# Patient Record
Sex: MALE | Race: WHITE
[De-identification: names, ages, dates, MRNs, and addresses within clinical notes are randomized per-mention and may not be internally consistent; named-entity substitution may affect disease eponyms.]

---

## 2018-01-21 ENCOUNTER — HOSPITAL ENCOUNTER (EMERGENCY)
Dept: HOSPITAL 17 - NEPC | Age: 33
Discharge: HOME | End: 2018-01-21
Payer: COMMERCIAL

## 2018-01-21 VITALS
HEART RATE: 96 BPM | OXYGEN SATURATION: 100 % | SYSTOLIC BLOOD PRESSURE: 160 MMHG | TEMPERATURE: 98 F | DIASTOLIC BLOOD PRESSURE: 102 MMHG | RESPIRATION RATE: 16 BRPM

## 2018-01-21 VITALS — HEIGHT: 67 IN | WEIGHT: 220.46 LBS | BODY MASS INDEX: 34.6 KG/M2

## 2018-01-21 VITALS — OXYGEN SATURATION: 100 %

## 2018-01-21 DIAGNOSIS — R20.2: ICD-10-CM

## 2018-01-21 DIAGNOSIS — K21.9: ICD-10-CM

## 2018-01-21 DIAGNOSIS — Z79.899: ICD-10-CM

## 2018-01-21 DIAGNOSIS — T78.40XA: Primary | ICD-10-CM

## 2018-01-21 DIAGNOSIS — Z88.2: ICD-10-CM

## 2018-01-21 LAB
BASOPHILS # BLD AUTO: 0.1 TH/MM3 (ref 0–0.2)
BASOPHILS NFR BLD: 0.8 % (ref 0–2)
BUN SERPL-MCNC: 10 MG/DL (ref 7–18)
CALCIUM SERPL-MCNC: 9.4 MG/DL (ref 8.5–10.1)
CHLORIDE SERPL-SCNC: 104 MEQ/L (ref 98–107)
CREAT SERPL-MCNC: 1.07 MG/DL (ref 0.6–1.3)
EOSINOPHIL # BLD: 0.1 TH/MM3 (ref 0–0.4)
EOSINOPHIL NFR BLD: 0.9 % (ref 0–4)
ERYTHROCYTE [DISTWIDTH] IN BLOOD BY AUTOMATED COUNT: 12.6 % (ref 11.6–17.2)
GFR SERPLBLD BASED ON 1.73 SQ M-ARVRAT: 80 ML/MIN (ref 89–?)
GLUCOSE SERPL-MCNC: 88 MG/DL (ref 74–106)
HCO3 BLD-SCNC: 30.2 MEQ/L (ref 21–32)
HCT VFR BLD CALC: 46.1 % (ref 39–51)
HGB BLD-MCNC: 16.1 GM/DL (ref 13–17)
LYMPHOCYTES # BLD AUTO: 2.4 TH/MM3 (ref 1–4.8)
LYMPHOCYTES NFR BLD AUTO: 26 % (ref 9–44)
MAGNESIUM SERPL-MCNC: 2.5 MG/DL (ref 1.5–2.5)
MCH RBC QN AUTO: 28.9 PG (ref 27–34)
MCHC RBC AUTO-ENTMCNC: 34.8 % (ref 32–36)
MCV RBC AUTO: 83.1 FL (ref 80–100)
MONOCYTE #: 0.8 TH/MM3 (ref 0–0.9)
MONOCYTES NFR BLD: 8.4 % (ref 0–8)
NEUTROPHILS # BLD AUTO: 5.8 TH/MM3 (ref 1.8–7.7)
NEUTROPHILS NFR BLD AUTO: 63.9 % (ref 16–70)
PLATELET # BLD: 315 TH/MM3 (ref 150–450)
PMV BLD AUTO: 8 FL (ref 7–11)
RBC # BLD AUTO: 5.55 MIL/MM3 (ref 4.5–5.9)
SODIUM SERPL-SCNC: 138 MEQ/L (ref 136–145)
WBC # BLD AUTO: 9.1 TH/MM3 (ref 4–11)

## 2018-01-21 PROCEDURE — 80048 BASIC METABOLIC PNL TOTAL CA: CPT

## 2018-01-21 PROCEDURE — 85025 COMPLETE CBC W/AUTO DIFF WBC: CPT

## 2018-01-21 PROCEDURE — 96374 THER/PROPH/DIAG INJ IV PUSH: CPT

## 2018-01-21 PROCEDURE — 93005 ELECTROCARDIOGRAM TRACING: CPT

## 2018-01-21 PROCEDURE — 96375 TX/PRO/DX INJ NEW DRUG ADDON: CPT

## 2018-01-21 PROCEDURE — 83735 ASSAY OF MAGNESIUM: CPT

## 2018-01-21 PROCEDURE — 71045 X-RAY EXAM CHEST 1 VIEW: CPT

## 2018-01-21 PROCEDURE — 99284 EMERGENCY DEPT VISIT MOD MDM: CPT

## 2018-01-21 NOTE — RADRPT
EXAM DATE/TIME:  01/21/2018 20:15 

 

[facility] COMPARISON:     

No previous studies available for comparison.

 

                     

INDICATIONS :     

Cough

                     

 

MEDICAL HISTORY :     

None.          

 

SURGICAL HISTORY :     

None.   

 

ENCOUNTER:     

Initial                                        

 

ACUITY:     

1 day      

 

PAIN SCORE:     

0/10

 

LOCATION:      

chest 

 

FINDINGS:     

A single view of the chest demonstrates the lungs to be symmetrically aerated without evidence of mas
s, infiltrate or effusion.  The cardiomediastinal contours are unremarkable.  Osseous structures are 
intact.

 

CONCLUSION:     No acute disease.  

 

 

 

 Harris Cuellar MD on January 21, 2018 at 20:28           

Board Certified Radiologist.

 This report was verified electronically.

## 2018-01-21 NOTE — PD
HPI


Chief Complaint:  Allergic/Adverse Reaction


Time Seen by Provider:  20:08


Travel History


International Travel<30 days:  No


Contact w/Intl Traveler<30days:  No


Traveled to known affect area:  No





History of Present Illness


HPI


The patient is a 32 year old male who presents to the [facility] emergency 

department with a history of a sensation of his throat closing/feeling like 

something was stuck in his throat while eating prior to arrival.  He reports 

that this is happens several times in the past over the last few months.  The 

patient reports that it seems to happen when he is eating cheese, usually on 

pasta with sauce.  On this occasion, the patient reports that he also has a 

tingling sensation on his throat and tongue as well as a sour sensation and the 

back of the throat and on the tongue.  He was concerned that this may be 

related to an allergic reaction.  He denies ever having problems with food 

allergies in the past.  He denies ever having allergy testing.  He denies 

having any rashes associated with this.  He denies having any chest pain or 

shortness of breath.  He denies having any abdominal cramping or diarrhea 

associated with this.  The patient does report having a history of acid reflux.

  He is on AcipHex.  He reports that this is usually well controlled on his 

AcipHex.  He reports that he has had endoscopy 1 year ago and was told to have 

it repeated in one year related to an abnormality.  He is unable to explain 

exactly what the abnormality was.  He denies having any difficulty swallowing 

solids such as meat.  On review of systems otherwise, the patient denies having 

any recent fevers, cough, congestion, neck pain,  vomiting, urinary symptoms, 

or neurologic symptoms.





[facility]


Past Medical History


*** Narrative Medical


The patient's past medical history is significant for anxiety disorder, GERD. 

PCP Dr. Navarro in [place]


Medical History:  Denies Significant Hx





Past Surgical History


*** Narrative Surgical


The patient's past surgical history is significant for cholecystectomy.


Cholecystectomy:  Yes





Social History


Alcohol Use:  No


Tobacco Use:  No


Substance Use:  No





Allergies-Medications


(Allergen,Severity, Reaction):  


Coded Allergies:  


     Sulfa (Sulfonamide Antibiotics) (Verified  Allergy, Unknown, 1/21/18)


Reported Meds & Prescriptions





Reported Meds & Active Scripts


Active


Medrol Dosepak (Methylprednisolone) 4 Mg Dspk 4 Mg PO AS DIRECTED


     Per Pharmacist direction


Claritin (Loratadine) 10 Mg Cap 10 Mg PO DAILY 7 Days


Epipen 2-Jasvir Inj (Epinephrine) 0.3 Mg/0.3 Ml Pfpen 0.3 Mg IM ONCE PRN


Reported


Lorazepam 0.5 Mg Tab 0.5 Mg PO DAILY PRN





Narrative Medication


Aciphex





Review of Systems


Except as stated in HPI:  all other systems reviewed are Neg


General / Constitutional:  No: Fever


Eyes:  No: Visual changes


HENT:  Positive: Sore Throat, No: Headaches, Congestion


Cardiovascular:  No: Chest Pain or Discomfort


Respiratory:  No: Shortness of Breath


Gastrointestinal:  Positive: Indigestion, No: Nausea, Vomiting, Diarrhea, 

Abdominal Pain


Genitourinary:  No: Dysuria


Musculoskeletal:  No: Pain


Skin:  No Rash


Neurologic:  No: Weakness


Psychiatric:  No: Depression


Endocrine:  No: Polydipsia


Hematologic/Lymphatic:  No: Easy Bruising





Physical Exam


Narrative


General: 


The patient is a well-developed well-nourished male in no acute distress.





Head and Neck exam: 


Head is normocephalic atraumatic. 


Eyes: EOMI, pupils are equal round and reactive to light. 


Nose: Midline septum with pink mucous membranes 


Mouth: Dentition unremarkable. Moist mucus membranes. Posterior oropharynx is 

not erythematous. No tonsillar hypertrophy. Uvula midline. Airway patent. 


Neck: No palpable lymphadenopathy. No nuchal rigidity. No thyromegaly. 





Cardiovascular: 


Regular rate and rhythm without murmurs, gallops, or rubs. 





Lungs: 


Clear to auscultation bilaterally. No wheezes, rhonchi, or rales.


 


Abdomen:


Soft, without tenderness to palpation in all 4 quadrants of the abdomen. No 

guarding, rebound, or rigidity.  Normal bowel sounds are audible.  No 

tenderness on palpation of McBurney's point.  





Extremities: 


No clubbing, cyanosis, or edema. 2+ pulses in all 4 extremities.  No calf 

tenderness on palpation.





Back: 


No costovertebral angle tenderness to palpation. 





Neurologic Exam: Grossly nonfocal.





Skin Exam: No rash noted. Intact skin that is warm and dry.





Data


Data


Last Documented VS





Vital Signs








  Date Time  Temp Pulse Resp B/P (MAP) Pulse Ox O2 Delivery O2 Flow Rate FiO2


 


1/21/18 21:51   16  100 Room Air  


 


1/21/18 21:38        


 


1/21/18 19:33 98.0 96      








Orders





 Orders


Electrocardiogram (1/21/18 20:11)


Complete Blood Count With Diff (1/21/18 20:11)


Basic Metabolic Panel (Bmp) (1/21/18 20:11)


Magnesium (Mg) (1/21/18 20:11)


Chest, Single Ap (1/21/18 20:11)


Iv Access Insert/Monitor (1/21/18 20:11)


Ecg Monitoring (1/21/18 20:11)


Oximetry (1/21/18 20:11)


Diphenhydramine Inj (Benadryl Inj) (1/21/18 20:30)


Methylprednisolone So Succ Inj (Solumedr (1/21/18 20:30)





Labs





Laboratory Tests








Test


  1/21/18


20:15


 


White Blood Count 9.1 TH/MM3 


 


Red Blood Count 5.55 MIL/MM3 


 


Hemoglobin 16.1 GM/DL 


 


Hematocrit 46.1 % 


 


Mean Corpuscular Volume 83.1 FL 


 


Mean Corpuscular Hemoglobin 28.9 PG 


 


Mean Corpuscular Hemoglobin


Concent 34.8 % 


 


 


Red Cell Distribution Width 12.6 % 


 


Platelet Count 315 TH/MM3 


 


Mean Platelet Volume 8.0 FL 


 


Neutrophils (%) (Auto) 63.9 % 


 


Lymphocytes (%) (Auto) 26.0 % 


 


Monocytes (%) (Auto) 8.4 % 


 


Eosinophils (%) (Auto) 0.9 % 


 


Basophils (%) (Auto) 0.8 % 


 


Neutrophils # (Auto) 5.8 TH/MM3 


 


Lymphocytes # (Auto) 2.4 TH/MM3 


 


Monocytes # (Auto) 0.8 TH/MM3 


 


Eosinophils # (Auto) 0.1 TH/MM3 


 


Basophils # (Auto) 0.1 TH/MM3 


 


CBC Comment DIFF FINAL 


 


Differential Comment  


 


Blood Urea Nitrogen 10 MG/DL 


 


Creatinine 1.07 MG/DL 


 


Random Glucose 88 MG/DL 


 


Calcium Level 9.4 MG/DL 


 


Magnesium Level 2.5 MG/DL 


 


Sodium Level 138 MEQ/L 


 


Potassium Level 3.5 MEQ/L 


 


Chloride Level 104 MEQ/L 


 


Carbon Dioxide Level 30.2 MEQ/L 


 


Anion Gap 4 MEQ/L 


 


Estimat Glomerular Filtration


Rate 80 ML/MIN 


 











MDM


Medical Decision Making


Medical Screen Exam Complete:  Yes


Emergency Medical Condition:  Yes


Medical Record Reviewed:  Yes


Differential Diagnosis


Developing esophageal stricture related to acid reflux, versus globus pallidus 

from anxiety, versus allergic reaction


Narrative Course


During the course of the patients emergency department visit, the patients 

history, examination, and differential diagnosis were reviewed with the 

patient. The patient was placed on a cardiac monitor with oximetry and frequent 

blood pressure monitoring. The patient had  IV access obtained and blood work 

sent for analysis.  The patient had an ECG done on arrival that shows a sinus 

rhythm heart rate of 82, QRS duration 92 ms,  ms.  No acute ST segment 

elevation, T waves inverted in V1.





The patient was initially provided Solu-Medrol 100 mg IV 1, Benadryl 25 mg IV.





The patients laboratory studies were reviewed and remarkable for a white count 

of 9.1, hemoglobin 16.1, platelets 315 with a 8.4 monocytes, V2 metabolic 

profile is remarkable for a GFR of 80, magnesium 2.5





Radiology studies were reviewed and remarkable for a chest x-ray that shows no 

evidence of acute cardiopulmonary disease.





The patient on reexamination is reportedly feeling improved.  The patient will 

be discharged home with a prescription for Claritin, a Medrol Dosepak taper, 

and an EpiPen.  The patient was instructed to follow-up with his primary care 

physician for referral to an allergist.  The patient is also instructed to 

follow-up with gastroenterologist as previously planned.





The patient is resting comfortably and feels better, is alert and in no 

distress. The patients results and examination findings were discussed with the 

patient. The repeat examination is unremarkable and benign. The history, exam, 

diagnostic testing, and current condition do not suggest any significant 

pathology to warrant further testing, continued ED treatment, admission, or 

surgical evaluation at this point. The vital signs have been stable. The 

patient does not have uncontrollable pain, intractable vomiting, or other 

significant symptoms. The patient's condition is stable and appropriate for 

discharge. The patient will pursue further outpatient evaluation with a primary 

care physician or other designated or consulting physician as indicated in the 

discharge instructions. The patient expressed understanding and was agreeable 

with this plan.





Diagnosis





 Primary Impression:  


 Allergic reaction


 Qualified Codes:  T78.40XA - Allergy, unspecified, initial encounter


Referrals:  


Gastroenterologist


1 week





Primary Care Physician


3 days


Patient Instructions:  General Allergic Reaction (ED), General Instructions





***Additional Instructions:  


The patient is instructed regarding the importance of following up for repeat 

endoscopy as previously instructed by his gastroenterologist.


Scripts


Methylprednisolone Dosepak (Medrol Dosepak) 4 Mg Dspk


4 MG PO AS DIRECTED, #1 DSPK 0 Refills


   Per Pharmacist direction


   Prov: Padmaja Noriega MD         1/21/18 


Loratadine (Claritin) 10 Mg Cap


10 MG PO DAILY for Allergy Management for 7 Days, #7 CAP 0 Refills


   Prov: Padmaja Noriega MD         1/21/18 


Epinephrine Inj (Epipen 2-Jasvir Inj) 0.3 Mg/0.3 Ml Pfpen


0.3 MG IM ONCE Y for ALLERGIC REACTION, #1 PACK 0 Refills


   Prov: Padmaja Noriega MD         1/21/18


Disposition:  01 DISCHARGE HOME


Condition:  Stable











Padmaja Noriega MD Jan 21, 2018 20:17

## 2018-01-22 NOTE — EKG
Date Performed: 01/21/2018       Time Performed: 21:47:53

 

PTAGE:      32 years

 

EKG:      Sinus rhythm 

 

 INDETERMINATE AXIS ATYPICAL ECG 

 

NO PREVIOUS TRACING            

 

DOCTOR:   Donna Mcneal  Interpretating Date/Time  01/22/2018 19:24:18